# Patient Record
Sex: MALE | Race: ASIAN | NOT HISPANIC OR LATINO | Employment: FULL TIME | URBAN - METROPOLITAN AREA
[De-identification: names, ages, dates, MRNs, and addresses within clinical notes are randomized per-mention and may not be internally consistent; named-entity substitution may affect disease eponyms.]

---

## 2022-04-26 ENCOUNTER — OFFICE VISIT (OUTPATIENT)
Dept: URGENT CARE | Facility: URGENT CARE | Age: 32
End: 2022-04-26
Payer: COMMERCIAL

## 2022-04-26 VITALS
DIASTOLIC BLOOD PRESSURE: 75 MMHG | WEIGHT: 164.2 LBS | HEART RATE: 135 BPM | OXYGEN SATURATION: 100 % | TEMPERATURE: 98.4 F | SYSTOLIC BLOOD PRESSURE: 115 MMHG

## 2022-04-26 DIAGNOSIS — R51.9 ACUTE NONINTRACTABLE HEADACHE, UNSPECIFIED HEADACHE TYPE: Primary | ICD-10-CM

## 2022-04-26 DIAGNOSIS — R00.0 TACHYCARDIA: ICD-10-CM

## 2022-04-26 DIAGNOSIS — R42 LIGHTHEADEDNESS: ICD-10-CM

## 2022-04-26 DIAGNOSIS — Z82.3 FHX: STROKE: ICD-10-CM

## 2022-04-26 DIAGNOSIS — K92.0 COFFEE GROUND EMESIS: ICD-10-CM

## 2022-04-26 PROCEDURE — 99204 OFFICE O/P NEW MOD 45 MIN: CPT | Performed by: PHYSICIAN ASSISTANT

## 2022-04-26 NOTE — PROGRESS NOTES
Chief Complaint   Patient presents with     Headache     Dizziness     Pt feels light headed when standing. Feels like all the blood is being drained from his face. He almost blacked out. Onset- Thursday      Vomiting             ASSESSMENT:     ICD-10-CM    1. Acute nonintractable headache, unspecified headache type  R51.9    2. Coffee ground emesis  K92.0    3. Lightheadedness  R42    4. FHx: stroke  Z82.3          PLAN:32-year-old with cap-like distribution of headache since yesterday.  When he tips his head back it creates a pulsating sensation in his head.  Has had some episodes of lightheadedness for 5 days.  Did actually vomit black coffee-ground looking material x1.  Has had black stools.  Shortness of breath with walking.  No chest pain.  Family history of stroke.  Mother and father had strokes in their 50s.  Brother had a stroke last year in his 40s.  To ER for further evaluation and treatment.  Instructed him to go immediately.  Limited on labs and imaging.  May have a bleeding ulcer or stroke or both.  Looks very pale.  Likely anemic.  I have discussed clinical findings with patient.  All questions are answered, patient indicates understanding of these issues and is in agreement with plan.   Patient care instructions are discussed/given at the end of visit.       Kaylee Blevins PA-C      SUBJECTIVE:  32-year-old male presents for lightheadedness when standing and shortness of breath with walking for the past 5 days.  Yesterday and noted a headache that will not go away.  Like distribution on his head.  When he tilts his head back he notes pulsating sensation of the head.  No vision changes.  No photophobia.  No nausea or vomiting.  5 days ago did throw up coffee-ground material.  Also has had black stool.  The week prior he used ibuprofen for tonsil irritation.  Denies chest pain.  Family history of stroke in mother and father in their 50s.  Brother had a stroke last year in his 40s.      No Known  Allergies    No past medical history on file.    No current outpatient medications on file prior to visit.  No current facility-administered medications on file prior to visit.      Social History     Tobacco Use     Smoking status: Not on file     Smokeless tobacco: Not on file   Substance Use Topics     Alcohol use: Not on file       ROS:  CONSTITUTIONAL: Negative for fever.  EYES: Negative for eye problems.  ENT: Negative sore throat   RESP: As above.  CV: Negative for chest pains.  GI: As above.  MUSCULOSKELETAL:  Negative for significant muscle or joint pains.  NEUROLOGIC: As above   SKIN: Negative for rash.  PSYCH: Normal mentation for age.    OBJECTIVE:  /75 (BP Location: Left arm, Patient Position: Sitting, Cuff Size: Adult Regular)   Pulse (!) 135   Temp 98.4  F (36.9  C) (Tympanic)   Wt 74.5 kg (164 lb 3.2 oz)   SpO2 100%   GENERAL APPEARANCE: Pale looking.    EYES:Conjunctiva/sclera clear.  EOMs intact.  Pupils equal reactive to light and accommodation.  EARS: No cerumen.   Ear canals w/o erythema.  TM's intact w/o erythema.    THROAT: No erythema w/o tonsillar enlargement . No exudates.  NECK: Supple, nontender, no lymphadenopathy.  Full range of motion of neck.  Tilting head back causes pulsation over the top of the scalp.  RESP: Lungs clear to auscultation - no rales, rhonchi or wheezes  CV: Fast heart rate.  Regular rhythm.  Normal S1 S2, no murmur noted.  NEURO: Awake, alert    SKIN: No rashes  Abdomen-soft, nontender.  No hepatosplenomegaly.      Kaylee Blevins PA-C